# Patient Record
(demographics unavailable — no encounter records)

---

## 2024-11-05 NOTE — ASSESSMENT
[FreeTextEntry1] : 1. History of pituitary microadenoma. Not seen on most recent pituitary MRIs - obtain prior records from [facility], including pituitary MRI report - cont cabergoline 0.25 mg qw, pending labs  2. History of elevated estrogens We discussed that elevated estrogen levels can be seen in liver diseases, including fatty liver. - advised to see pulmonary for YANY management and resuming CPAP - I advised that the successful weight loss will likely to improve testosterone and decrease estrogen levels, as well as sleep apnea.  - will repeat fasting testosterone, gonadotropins, prolactin - diet to lower estrogens naturally reviewed in details, including incorporating cruciferous vegetables (including broccoli and kale) that contain phytochemicals that block estrogen production, shiitake and portobello mushrooms (naturally reduce aromatase), red grapes (contain natural estrogen blockers resveratrol and proanthocyanidin),  sesame seeds and flaxseed, as well as whole grains like oats, green tea (good source of polyphenol pomegranates are high in estrogen-blocking phytochemicals) - R+B of testosterone replacement reviewed in details, including worsening of YANY and potential adverse effects on CV system, effect on hematocrit, PSA, and liver functions, as well as decrease in sperm count. - I reviewed with the patient topical vs injectable vs implantable testosterone preparations, as well as proper use/applications/precautions and monitoring. - Cialis 20 mg PRN  3. Obesity - Current approaches to weight management are discussed with the patient.  Suggested extensive nutritional education program. Proper dietary restrictions and exercise routines discussed. Medical weight loss therapies were reviewed with the patient. Patient wants to continue with LSM changes  RTC 4-6 months, if stable

## 2024-11-05 NOTE — HISTORY OF PRESENT ILLNESS
[FreeTextEntry1] : 38 year  Male is here to f/u on his for hormonal evaluation   *** Nov 05, 2024 ***  feels well, eating healthier. lost 20 lbs zepbound was approved , but he decided to try "naturally" saw pulmonary (Dr. Bailey) - dx'ed with a mild YANY; planned to start on CPAP tolerates Cabergoline 0.25 mg qw. headaches resolved still snores. Libido is better.  no recent labs  *** Mar 25, 2024 ***  has been seeing Dr. Hylton for tension headaches and family h/o cerebral aneurysm Brain MRI (11/01/23)- no evidence of pit adenoma also, seen by Dr. Steel no recent hormonal work up done Has been doing well otherwise, except for headaches taking cialis as needed, tolerates well (no headaches). Libido is decreased. Am erections are decreased. Snores.  Was diagnosed with YANY, used CPAP machine for a while. Lost weight and felt that did not need it anymore. Gained > 20 lbs over the past 3 years. Unable to control his weight despite staying on Weight Watchers, keeping low carb meals  CT abdomen (12/16/2020)-normal adrenal glands Ultrasound scrotum (10/23/2020)-normal Pituitary MRI (10/23/20)- no evidence of pit adenoma  HPI: Patient complains of low libido and some worsening erections x past several years. He's been seen by an endo at [facility] [place], who diagnosed him with a pituitary microadenoma. He never required TRT, but was prescribed cialis 10 mg PRN, which helped with sex drive. He reports normal spontaneous am erections, but maintaining  erections slightly difficult when is not using cialis. Shaving is well preserved. He denies visual disturbances or headaches, dizziness, nausea, vomiting, abdominal pain,  breast discharge, worsening in peripheral vision. He does c/o excessive snoring. He underwent puberty at a normal rate compared with his peers. No history of learning disabilities or behavioral disorders. No history of insults to the brain, including surgery, trauma, tumors, or radiation exposure;  but does reports being hit several times in his testes while playing sport. He takes OTC MVI and fish only. He's not smoker and denies taking illicit substances. No history of diabetes, HIV, liver disease, or kidney disease. No history of medication use including anabolic steroids, glucocorticoids, chronic pain medications, or history of chemotherapy. His sense of smell is intact.  Family history is negative for infertility problems and low testosterone. Currently he is in monogamous relationship with wife. Fathered 1 son, and is planning to have more children.

## 2025-05-13 NOTE — HISTORY OF PRESENT ILLNESS
[FreeTextEntry1] : 38 year  Male is here to f/u on his for hormonal evaluation   *** May 13, 2025 ***  decided to proceed with Zepbound, currently on 5 mg qw lost 27 lbs since the last visit using CPAP machine- to see Dr. Bailey next month remains on Cabergoline 0.25 mg qw sleep is better, no headaches snoring is better Good libido. using Cialis PRN no recent labs  Pit MRI (01/22/25)- no pituitary adenoma. pit stalk in midline. normal OC seeing Dr. Hylton ( neurology)  *** Nov 05, 2024 ***  feels well, eating healthier. lost 20 lbs zepbound was approved , but he decided to try "naturally" saw pulmonary (Dr. Bailey) - dx'ed with a mild YANY; planned to start on CPAP tolerates Cabergoline 0.25 mg qw. headaches resolved still snores. Libido is better.  no recent labs  *** Mar 25, 2024 ***  has been seeing Dr. Hylton for tension headaches and family h/o cerebral aneurysm Brain MRI (11/01/23)- no evidence of pit adenoma also, seen by Dr. Steel no recent hormonal work up done Has been doing well otherwise, except for headaches taking cialis as needed, tolerates well (no headaches). Libido is decreased. Am erections are decreased. Snores.  Was diagnosed with YANY, used CPAP machine for a while. Lost weight and felt that did not need it anymore. Gained > 20 lbs over the past 3 years. Unable to control his weight despite staying on Weight Watchers, keeping low carb meals  CT abdomen (12/16/2020)-normal adrenal glands Ultrasound scrotum (10/23/2020)-normal Pituitary MRI (10/23/20)- no evidence of pit adenoma  HPI: Patient complains of low libido and some worsening erections x past several years. He's been seen by an endo at [facility] [place], who diagnosed him with a pituitary microadenoma. He never required TRT, but was prescribed cialis 10 mg PRN, which helped with sex drive. He reports normal spontaneous am erections, but maintaining  erections slightly difficult when is not using cialis. Shaving is well preserved. He denies visual disturbances or headaches, dizziness, nausea, vomiting, abdominal pain,  breast discharge, worsening in peripheral vision. He does c/o excessive snoring. He underwent puberty at a normal rate compared with his peers. No history of learning disabilities or behavioral disorders. No history of insults to the brain, including surgery, trauma, tumors, or radiation exposure;  but does reports being hit several times in his testes while playing sport. He takes OTC MVI and fish only. He's not smoker and denies taking illicit substances. No history of diabetes, HIV, liver disease, or kidney disease. No history of medication use including anabolic steroids, glucocorticoids, chronic pain medications, or history of chemotherapy. His sense of smell is intact.  Family history is negative for infertility problems and low testosterone. Currently he is in monogamous relationship with wife. Fathered 1 son, and is planning to have more children.

## 2025-05-13 NOTE — ASSESSMENT
[FreeTextEntry1] : 1. History of pituitary microadenoma. Not seen on most recent pituitary MRIs - obtain prior records from [facility], including pituitary MRI report - cont cabergoline 0.25 mg qw, pending labs  2. History of elevated estrogens We discussed that elevated estrogen levels can be seen in liver diseases, including fatty liver. -  f/u pulmonary for YANY management  - will repeat fasting testosterone, gonadotropins, prolactin - diet to lower estrogens naturally reviewed in details, including incorporating cruciferous vegetables (including broccoli and kale) that contain phytochemicals that block estrogen production, shiitake and portobello mushrooms (naturally reduce aromatase), red grapes (contain natural estrogen blockers resveratrol and proanthocyanidin),  sesame seeds and flaxseed, as well as whole grains like oats, green tea (good source of polyphenol pomegranates are high in estrogen-blocking phytochemicals) - R+B of testosterone replacement reviewed in details, including worsening of YANY and potential adverse effects on CV system, effect on hematocrit, PSA, and liver functions, as well as decrease in sperm count. - I reviewed with the patient topical vs injectable vs implantable testosterone preparations, as well as proper use/applications/precautions and monitoring. - Cialis 20 mg PRN  3. Obesity - Current approaches to weight management are discussed with the patient. Suggested extensive nutritional education program. Proper dietary restrictions and exercise routines discussed.Doing great on Zepbound- continue 5 mg qw (R+B)  4. Small palpable thyroid nodule - thyroid US   RTC 4-6 months, if stable

## 2025-05-13 NOTE — ASSESSMENT
[FreeTextEntry1] : 1. History of pituitary microadenoma. Not seen on most recent pituitary MRIs - obtain prior records from [facility], including pituitary MRI report - cont cabergoline 0.25 mg qw, pending labs  2. History of elevated estrogens We discussed that elevated estrogen levels can be seen in liver diseases, including fatty liver. -  f/u pulmonary for YANY management  - will repeat fasting testosterone, gonadotropins, prolactin - diet to lower estrogens naturally reviewed in details, including incorporating cruciferous vegetables (including broccoli and kale) that contain phytochemicals that block estrogen production, shiitake and portobello mushrooms (naturally reduce aromatase), red grapes (contain natural estrogen blockers resveratrol and proanthocyanidin),  sesame seeds and flaxseed, as well as whole grains like oats, green tea (good source of polyphenol pomegranates are high in estrogen-blocking phytochemicals) - R+B of testosterone replacement reviewed in details, including worsening of YANY and potential adverse effects on CV system, effect on hematocrit, PSA, and liver functions, as well as decrease in sperm count. - I reviewed with the patient topical vs injectable vs implantable testosterone preparations, as well as proper use/applications/precautions and monitoring. - Cialis 20 mg PRN  3. Obesity - Current approaches to weight management are discussed with the patient. Suggested extensive nutritional education program. Proper dietary restrictions and exercise routines discussed.Doing great on Zepbound- continue 5 mg qw (R+B)  4. Small palpable thyroid nodule - thyroid US   RTC 4-6 months, if stable

## 2025-05-13 NOTE — HISTORY OF PRESENT ILLNESS
[FreeTextEntry1] : 38 year  Male is here to f/u on his for hormonal evaluation   *** May 13, 2025 ***  decided to proceed with Zepbound, currently on 5 mg qw lost 27 lbs since the last visit using CPAP machine- to see Dr. Bailey next month remains on Cabergoline 0.25 mg qw sleep is better, no headaches snoring is better Good libido. using Cialis PRN no recent labs  Pit MRI (01/22/25)- no pituitary adenoma. pit stalk in midline. normal OC seeing Dr. Hylton ( neurology)  *** Nov 05, 2024 ***  feels well, eating healthier. lost 20 lbs zepbound was approved , but he decided to try "naturally" saw pulmonary (Dr. Bailey) - dx'ed with a mild YANY; planned to start on CPAP tolerates Cabergoline 0.25 mg qw. headaches resolved still snores. Libido is better.  no recent labs  *** Mar 25, 2024 ***  has been seeing Dr. Hylton for tension headaches and family h/o cerebral aneurysm Brain MRI (11/01/23)- no evidence of pit adenoma also, seen by Dr. Steel no recent hormonal work up done Has been doing well otherwise, except for headaches taking cialis as needed, tolerates well (no headaches). Libido is decreased. Am erections are decreased. Snores.  Was diagnosed with YANY, used CPAP machine for a while. Lost weight and felt that did not need it anymore. Gained > 20 lbs over the past 3 years. Unable to control his weight despite staying on Weight Watchers, keeping low carb meals  CT abdomen (12/16/2020)-normal adrenal glands Ultrasound scrotum (10/23/2020)-normal Pituitary MRI (10/23/20)- no evidence of pit adenoma  HPI: Patient complains of low libido and some worsening erections x past several years. He's been seen by an endo at [facility] [place], who diagnosed him with a pituitary microadenoma. He never required TRT, but was prescribed cialis 10 mg PRN, which helped with sex drive. He reports normal spontaneous am erections, but maintaining  erections slightly difficult when is not using cialis. Shaving is well preserved. He denies visual disturbances or headaches, dizziness, nausea, vomiting, abdominal pain,  breast discharge, worsening in peripheral vision. He does c/o excessive snoring. He underwent puberty at a normal rate compared with his peers. No history of learning disabilities or behavioral disorders. No history of insults to the brain, including surgery, trauma, tumors, or radiation exposure;  but does reports being hit several times in his testes while playing sport. He takes OTC MVI and fish only. He's not smoker and denies taking illicit substances. No history of diabetes, HIV, liver disease, or kidney disease. No history of medication use including anabolic steroids, glucocorticoids, chronic pain medications, or history of chemotherapy. His sense of smell is intact.  Family history is negative for infertility problems and low testosterone. Currently he is in monogamous relationship with wife. Fathered 1 son, and is planning to have more children.

## 2025-06-11 NOTE — HISTORY OF PRESENT ILLNESS
[FreeTextEntry1] : 39 year  Male is here to f/u on his for hormonal evaluation   *** Jun 11, 2025 ***  doing well, lost 4 lbs sine the last visit remains on Zepbound 5 mg qw labs reviewed as below prl- 18 Total- 494, free T- 0.9 LDL- 57, HDL- 51, TG- 123  Thyroid ultrasound (6/10/2025)-report is pending. ***Images reviewed-there is a right sided 2.3 cm mostly solid nodule with moderate vascularity.  *** May 13, 2025 ***  decided to proceed with Zepbound, currently on 5 mg qw lost 27 lbs since the last visit using CPAP machine- to see Dr. Bailey next month remains on Cabergoline 0.25 mg qw sleep is better, no headaches snoring is better Good libido. using Cialis PRN no recent labs  Pit MRI (01/22/25)- no pituitary adenoma. pit stalk in midline. normal OC seeing Dr. Hylton ( neurology)  *** Nov 05, 2024 ***  feels well, eating healthier. lost 20 lbs zepbound was approved , but he decided to try "naturally" saw pulmonary (Dr. Bailey) - dx'ed with a mild YANY; planned to start on CPAP tolerates Cabergoline 0.25 mg qw. headaches resolved still snores. Libido is better.  no recent labs  *** Mar 25, 2024 ***  has been seeing Dr. Hylton for tension headaches and family h/o cerebral aneurysm Brain MRI (11/01/23)- no evidence of pit adenoma also, seen by Dr. Steel no recent hormonal work up done Has been doing well otherwise, except for headaches taking cialis as needed, tolerates well (no headaches). Libido is decreased. Am erections are decreased. Snores.  Was diagnosed with YANY, used CPAP machine for a while. Lost weight and felt that did not need it anymore. Gained > 20 lbs over the past 3 years. Unable to control his weight despite staying on Weight Watchers, keeping low carb meals  CT abdomen (12/16/2020)-normal adrenal glands Ultrasound scrotum (10/23/2020)-normal Pituitary MRI (10/23/20)- no evidence of pit adenoma  HPI: Patient complains of low libido and some worsening erections x past several years. He's been seen by an endo at [facility] [place], who diagnosed him with a pituitary microadenoma. He never required TRT, but was prescribed cialis 10 mg PRN, which helped with sex drive. He reports normal spontaneous am erections, but maintaining  erections slightly difficult when is not using cialis. Shaving is well preserved. He denies visual disturbances or headaches, dizziness, nausea, vomiting, abdominal pain,  breast discharge, worsening in peripheral vision. He does c/o excessive snoring. He underwent puberty at a normal rate compared with his peers. No history of learning disabilities or behavioral disorders. No history of insults to the brain, including surgery, trauma, tumors, or radiation exposure;  but does reports being hit several times in his testes while playing sport. He takes OTC MVI and fish only. He's not smoker and denies taking illicit substances. No history of diabetes, HIV, liver disease, or kidney disease. No history of medication use including anabolic steroids, glucocorticoids, chronic pain medications, or history of chemotherapy. His sense of smell is intact.  Family history is negative for infertility problems and low testosterone. Currently he is in monogamous relationship with wife. Fathered 1 son, and is planning to have more children.

## 2025-06-11 NOTE — ASSESSMENT
[FreeTextEntry1] : 1. History of pituitary microadenoma. Not seen on most recent pituitary MRIs - obtain prior records from [facility], including pituitary MRI report - cont cabergoline 0.25 mg qw  2. History of elevated estrogens We discussed that elevated estrogen levels can be seen in liver diseases, including fatty liver. -  f/u pulmonary for YANY management  -  monitor  fasting testosterone, gonadotropins, prolactin - diet to lower estrogens naturally reviewed in details, including incorporating cruciferous vegetables (including broccoli and kale) that contain phytochemicals that block estrogen production, shiitake and portobello mushrooms (naturally reduce aromatase), red grapes (contain natural estrogen blockers resveratrol and proanthocyanidin),  sesame seeds and flaxseed, as well as whole grains like oats, green tea (good source of polyphenol pomegranates are high in estrogen-blocking phytochemicals) - R+B of testosterone replacement reviewed in details, including worsening of YANY and potential adverse effects on CV system, effect on hematocrit, PSA, and liver functions, as well as decrease in sperm count. - I reviewed with the patient topical vs injectable vs implantable testosterone preparations, as well as proper use/applications/precautions and monitoring. - Cialis 20 mg PRN  3. Obesity - Current approaches to weight management are discussed with the patient. Suggested extensive nutritional education program. Proper dietary restrictions and exercise routines discussed.Doing great on Zepbound- continue 5 mg qw (R+B)  4. Palpable thyroid nodule - Will wait for the official radiology report.  If nodule is confirmed, will refer for FNA. The procedure (risk and benefits) was reviewed in details   RTC 4-6 months, or sooner prn